# Patient Record
Sex: FEMALE | Race: WHITE
[De-identification: names, ages, dates, MRNs, and addresses within clinical notes are randomized per-mention and may not be internally consistent; named-entity substitution may affect disease eponyms.]

---

## 2018-02-08 ENCOUNTER — HOSPITAL ENCOUNTER (EMERGENCY)
Dept: HOSPITAL 41 - JD.ED | Age: 83
Discharge: HOME | End: 2018-02-08
Payer: MEDICARE
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
## 2018-02-08 DIAGNOSIS — Z96.643: ICD-10-CM

## 2018-02-08 DIAGNOSIS — E78.00: ICD-10-CM

## 2018-02-08 DIAGNOSIS — S00.03XA: ICD-10-CM

## 2018-02-08 DIAGNOSIS — S32.591A: Primary | ICD-10-CM

## 2018-02-08 DIAGNOSIS — I10: ICD-10-CM

## 2018-02-08 DIAGNOSIS — Z79.82: ICD-10-CM

## 2018-02-08 DIAGNOSIS — D69.6: ICD-10-CM

## 2018-02-08 DIAGNOSIS — S51.811A: ICD-10-CM

## 2018-02-08 PROCEDURE — 99284 EMERGENCY DEPT VISIT MOD MDM: CPT

## 2018-02-08 PROCEDURE — 36415 COLL VENOUS BLD VENIPUNCTURE: CPT

## 2018-02-08 PROCEDURE — 73502 X-RAY EXAM HIP UNI 2-3 VIEWS: CPT

## 2018-02-08 PROCEDURE — 84484 ASSAY OF TROPONIN QUANT: CPT

## 2018-02-08 PROCEDURE — 71045 X-RAY EXAM CHEST 1 VIEW: CPT

## 2018-02-08 PROCEDURE — 70450 CT HEAD/BRAIN W/O DYE: CPT

## 2018-02-08 PROCEDURE — 85025 COMPLETE CBC W/AUTO DIFF WBC: CPT

## 2018-02-08 PROCEDURE — 80053 COMPREHEN METABOLIC PANEL: CPT

## 2018-02-08 PROCEDURE — 93005 ELECTROCARDIOGRAM TRACING: CPT

## 2018-02-08 NOTE — EDM.PDOC
ED HPI GENERAL MEDICAL PROBLEM





- General


Chief Complaint: Lower Extremity Injury/Pain


Stated Complaint: FALL/HIP INJURY


Time Seen by Provider: 02/08/18 12:25


Source of Information: Reports: Patient, Family (daughter)


History Limitations: Reports: No Limitations





- History of Present Illness


INITIAL COMMENTS - FREE TEXT/NARRATIVE: 





86-year-old female presents for evaluation and treatment of injury sustained 

from a fall. Patient reports around 0900 this morning she was in the garage 

when she stumbled. She is primarily complaining of pain to her pelvis and both 

hips. States the pain radiates into her groin. She has a skin tear to her right 

arm and she also bumped her head. She is reports significant pain with standing 

and walking. Unsure if she had any syncope. No chest pain, neck pain, nausea or 

vomiting. She states that she did possibly feel dizzy. Patient reports and she 

fell she landed on her right side.





Patient has a history of thrombocytopenia.





She currently takes baby aspirin daily. No other blood thinners.





Patient has a history of bilateral hip replacements. 


Onset: Today


Location: Reports: Pelvis


  ** Pelvic


Pain Score (Numeric/FACES): 6





- Related Data


 Allergies











Allergy/AdvReac Type Severity Reaction Status Date / Time


 


No Known Allergies Allergy   Verified 02/08/18 12:14














Past Medical History





- Past Health History


Medical/Surgical History: Denies Medical/Surgical History


Cardiovascular History: Reports: High Cholesterol, Hypertension


Genitourinary History: Reports: UTI, Recurrent


Psychiatric History: Reports: None





- Past Surgical History


GI Surgical History: Reports: Cholecystectomy


Musculoskeletal Surgical History: Reports: Hip Replacement





Social & Family History





- Tobacco Use


Smoking Status *Q: Unknown Ever Smoked





Review of Systems





- Review of Systems


Review Of Systems: See Below


Cardiovascular: Denies: Chest Pain


GI/Abdominal: Denies: Nausea, Vomiting


Musculoskeletal: Denies: Neck Pain


Skin: Reports: Wound (skin tear right arm)


Neurological: Reports: Dizziness (unsure).  Denies: Headache, Syncope (unsure)





ED EXAM, GENERAL





- Physical Exam


Exam: See Below


Exam Limited By: No Limitations


General Appearance: Alert, WD/WN, Mild Distress


Eye Exam: Bilateral Eye: EOMI, Normal Inspection, PERRL


Ears: Normal External Exam, Normal Canal, Normal TMs


Nose: Normal Inspection


Throat/Mouth: Normal Inspection, Normal Lips, Normal Voice, No Airway Compromise


Head: Atraumatic, Normocephalic


Neck: Normal Inspection, Supple, Non-Tender, Full Range of Motion


Respiratory/Chest: No Respiratory Distress, Lungs Clear, Normal Breath Sounds


Cardiovascular: Normal Peripheral Pulses, Regular Rate, Rhythm, No Murmur


Peripheral Pulses: 2+: Radial (L), Radial (R), Posterior Tibial (L), Posterior 

Tibial (R)


GI/Abdominal: Soft, Non-Tender


Extremities: Normal Inspection, Normal Range of Motion, Non-Tender, Other (no 

shortening of the limbs, no pain with internal and external roatation of the 

bilateral hips)


Neurological: Alert, Oriented, Normal Cognition


Psychiatric: Normal Affect, Normal Mood


Skin Exam: Warm, Dry, Normal Color, Wound/Incision (1cm skin tear right 

posterior forearm)





EKG INTERPRETATION


EKG Date: 02/08/18


Time: 13:15


Rhythm: NSR


Rate (Beats/Min): 77


Axis: Normal


P-Wave: Present


QRS: Normal


ST-T: Normal


QT: Normal


EKG Interpretation Comments: 





NSR at 77 bpm. Early "R" wave transition. Decreased voltage limb leads. 

Reviewed by myself and Dr. Taylor. 





Course





- Vital Signs


Last Recorded V/S: 


 Last Vital Signs











Temp  36.0 C   02/08/18 12:14


 


Pulse  67   02/08/18 12:14


 


Resp  18   02/08/18 12:14


 


BP  174/81 H  02/08/18 12:14


 


Pulse Ox  98   02/08/18 12:14














- Orders/Labs/Meds


Labs: 


 Laboratory Tests











  02/08/18 02/08/18 Range/Units





  13:05 13:05 


 


WBC  9.80   (3.98-10.04)  K/mm3


 


RBC  4.40   (3.98-5.22)  M/mm3


 


Hgb  12.6   (11.2-15.7)  gm/L


 


Hct  38.2   (34.1-44.9)  %


 


MCV  86.8   (79.4-94.8)  fl


 


MCH  28.6   (25.6-32.2)  pg


 


MCHC  33.0   (32.2-35.5)  g/dl


 


RDW Std Deviation  42.7   (36.4-46.3)  fL


 


Plt Count  117 L   (182-369)  K/mm3


 


MPV  9.7   (9.4-12.3)  fl


 


Neut % (Auto)  85.6 H   (34.0-71.1)  %


 


Lymph % (Auto)  8.8 L   (19.3-51.7)  %


 


Mono % (Auto)  4.5 L   (4.7-12.5)  %


 


Eos % (Auto)  0.6 L   (0.7-5.8)  


 


Baso % (Auto)  0.1   (0.1-1.2)  %


 


Neut # (Auto)  8.39 H   (1.56-6.13)  K/mm3


 


Lymph # (Auto)  0.86 L   (1.18-3.74)  K/mm3


 


Mono # (Auto)  0.44 H   (0.24-0.36)  K/mm3


 


Eos # (Auto)  0.06   (0.04-0.36)  K/mm3


 


Baso # (Auto)  0.01   (0.01-0.08)  K/mm3


 


Manual Slide Review  Abnormal smear   


 


Sodium   138  (136-145)  mEq/L


 


Potassium   4.7  (3.5-5.1)  mEq/L


 


Chloride   104  ()  mEq/L


 


Carbon Dioxide   26  (21-32)  mEq/L


 


Anion Gap   12.7  (5-15)  


 


BUN   21 H  (7-18)  mg/dL


 


Creatinine   1.2 H  (0.55-1.02)  mg/dL


 


Est Cr Clr Drug Dosing   26.62  mL/min


 


Estimated GFR (MDRD)   43  (>60)  mL/min


 


BUN/Creatinine Ratio   17.5  (14-18)  


 


Glucose   100  ()  mg/dL


 


Calcium   9.1  (8.5-10.1)  mg/dL


 


Total Bilirubin   1.0  (0.2-1.0)  mg/dL


 


AST   28  (15-37)  U/L


 


ALT   22  (14-59)  U/L


 


Alkaline Phosphatase   61  ()  U/L


 


Troponin I   < 0.017  (0.00-0.056)  ng/mL


 


Total Protein   7.3  (6.4-8.2)  g/dl


 


Albumin   3.8  (3.4-5.0)  g/dl


 


Globulin   3.5  gm/dL


 


Albumin/Globulin Ratio   1.1  (1-2)  











Meds: 


Medications














Discontinued Medications














Generic Name Dose Route Start Last Admin





  Trade Name Freq  PRN Reason Stop Dose Admin


 


Tramadol HCl  50 mg  02/08/18 14:50  02/08/18 14:55





  Ultram  PO  02/08/18 14:51  50 mg





  ONETIME ONE   Administration














- Radiology Interpretation


Free Text/Narrative:: 





chest xray shows no acute intrathroacic process. 





Head CT without contrast shows soft tissue swelling and a hematoma to the right 

posterior scalp.





xray of the pelvis shows a right pubic rami fracture. Bilateral hip prosthesis 

with normal alignment. 





- Re-Assessments/Exams


Free Text/Narrative Re-Assessment/Exam: 





02/08/18 14:50


I reviewed the labs, EKG and imaging with the patient and her daughter. She has 

a walker at home. I will have her follow-up with orthopedics.





Discharge instructions as documented.





Departure





- Departure


Time of Disposition: 14:49


Disposition: Home, Self-Care 01


Condition: Fair


Clinical Impression: 


 Pubic ramus fracture








- Discharge Information


Referrals: 


Vinicio Albrecht MD [Primary Care Provider] - 


Isackson,Ronald D, MD [Physician] - 


Forms:  ED Department Discharge


Additional Instructions: 


Follow-up with orthopedics within 2 weeks for recheck. Recommend Dr. Pierre. 

Call [phone] schedule with him.





Use a walker at all times when up and moving around. 





Rest.





May use over-the-counter Tylenol or Motrin seen for pain relief. For pain not 

relieved by Tylenol or Motrin may take tramadol 1 tab every 4-6 hours. Do not 

drive or operate machinery within 12 hours of taking tramadol. Tramadol can be 

habit-forming, recommend you take as few these as needed  to control your pain.





You may use ice for additional pain relief.





Please return to ER if your symptoms change or worsen.

## 2018-02-08 NOTE — CR
Chest: AP view of the chest was obtained.

 

Comparison: No prior chest x-ray.

 

Heart size is normal.  Mild tortuosity of the thoracic aorta is seen. 

 Central lung markings are slightly increased believed to be 

accentuated from AP technique.  Lungs otherwise are clear.  Bony 

structures are osteopenic.  Surgical clips likely from prior 

cholecystectomy are seen within the upper right abdomen.

 

Impression:

1.  Nothing acute is appreciated on AP chest x-ray.

 

Diagnostic code #2

## 2018-02-08 NOTE — CR
Pelvis and right hip: AP view of the pelvis was obtained as well as 

crosstable lateral view of the right hip.

 

Comparison: Previous left hip study of 05/20/10.

 

Bilateral hip prosthesis are noted which show normal alignment.  

Fractures are seen within the superior right pubic ramus as well as 

within the superior and inferior left pubic ramus.  These appear to be

 acute.  Incidental sclerosis within the pubic symphysis is noted.  No

 additional fracture or other abnormality is appreciated.

 

Impression:

1.  Pubic rami fracture as noted above.

2.  Bilateral hip prosthesis which are normal in alignment.

3.  No other acute abnormality is appreciated.

 

Diagnostic code #3

## 2018-02-08 NOTE — CT
Head CT

 

Technique: Multiple axial sections through the brain were obtained.  

Intravenous contrast was not utilized.

 

Comparison: Prior head CT study of 11/23/15.

 

Findings: Soft tissue swelling and hematoma is seen within the 

posterior right scalp.

 

Ventricles along with basal cisterns and sulci over the convexities 

are mildly prominent.  Diminished density is noted within the 

periventricular and subcortical white matter compatible with mild 

small vessel ischemic demyelination change.  No other abnormal 

parenchymal densities are seen.  No evidence of intracranial 

hemorrhage.  No midline shift or mass effect is seen.

 

Bone window settings were reviewed which shows no acute calvarial 

abnormality.

 

Impression:

1.  Soft tissue swelling and hematoma within the posterior right 

scalp.

2.  Senescent change as noted above.

3.  No acute intracranial abnormality is identified.  No skull 

fracture is seen.

 

Diagnostic code #3

## 2020-05-09 ENCOUNTER — HOSPITAL ENCOUNTER (INPATIENT)
Dept: HOSPITAL 41 - JD.ED | Age: 85
LOS: 5 days | Discharge: HOME | DRG: 390 | End: 2020-05-14
Attending: SURGERY | Admitting: SURGERY
Payer: MEDICARE

## 2020-05-09 DIAGNOSIS — M81.0: ICD-10-CM

## 2020-05-09 DIAGNOSIS — Z79.899: ICD-10-CM

## 2020-05-09 DIAGNOSIS — Z87.440: ICD-10-CM

## 2020-05-09 DIAGNOSIS — H54.7: ICD-10-CM

## 2020-05-09 DIAGNOSIS — E83.51: ICD-10-CM

## 2020-05-09 DIAGNOSIS — E83.42: ICD-10-CM

## 2020-05-09 DIAGNOSIS — K21.9: ICD-10-CM

## 2020-05-09 DIAGNOSIS — E78.00: ICD-10-CM

## 2020-05-09 DIAGNOSIS — K56.609: Primary | ICD-10-CM

## 2020-05-09 DIAGNOSIS — I10: ICD-10-CM

## 2020-05-09 DIAGNOSIS — Z90.49: ICD-10-CM

## 2020-05-09 DIAGNOSIS — Z98.49: ICD-10-CM

## 2020-05-09 DIAGNOSIS — Z96.649: ICD-10-CM

## 2020-05-09 DIAGNOSIS — H91.90: ICD-10-CM

## 2020-05-09 DIAGNOSIS — Z79.82: ICD-10-CM

## 2020-05-09 PROCEDURE — U0002 COVID-19 LAB TEST NON-CDC: HCPCS

## 2020-05-09 PROCEDURE — C9113 INJ PANTOPRAZOLE SODIUM, VIA: HCPCS

## 2020-05-09 NOTE — EDM.PDOC
ED HPI GENERAL MEDICAL PROBLEM





- General


Chief Complaint: Gastrointestinal Problem


Stated Complaint: VOMITTING SICK TO STOMACH


Time Seen by Provider: 05/09/20 21:58


Source of Information: Reports: Patient


History Limitations: Reports: No Limitations





- History of Present Illness


INITIAL COMMENTS - FREE TEXT/NARRATIVE: 





This is an 88-year-old female.  Yesterday she indicates that she had a bunch of 

friends over the brought lots of food and she had 2 big meals and she thinks 

they had MSG in them.  Around midnight she had onset of nausea and vomiting.  

She is vomited about 8 times since midnight.  She did have a small stool this 

morning and she passed some gas this afternoon.  The vomitus is sort of a dark 

yellow-brown but there are no coffee grounds noted.  She has had no diarrhea.  

She complains of generalized abdominal pain and not specific.  She says she has 

not been able to drink any water since every time she drinks water she vomits.  

Only 2 surgeries she had are an appendix and gallbladder.  She denies any 

history of abdominal obstruction.  She denies any fever or chills and no cough.

  Patient indicates her last urination was 20 minutes before coming to the ER.


  ** Abdomen


Pain Score (Numeric/FACES): 2





- Related Data


 Allergies











Allergy/AdvReac Type Severity Reaction Status Date / Time


 


No Known Allergies Allergy   Verified 05/09/20 21:54











Home Meds: 


 Home Meds





Aspirin [Halfprin] 81 mg PO DAILY 05/09/20 [History]


Cholecalciferol (Vitamin D3) [Vitamin D3] 2,000 unit PO DAILY 05/09/20 [History]


Denosumab [Prolia] 60 mg .XX ASDIRECTED 05/09/20 [History]


Fesoterodine Fumarate [Toviaz] 4 mg PO DAILY 05/09/20 [History]


Losartan [Cozaar] 50 mg PO DAILY 05/09/20 [History]


Omeprazole 20 mg PO ONETIME 05/09/20 [History]


Pravastatin [Pravachol] 40 mg PO DAILY 05/09/20 [History]











Past Medical History





- Past Health History


Medical/Surgical History: Denies Medical/Surgical History


HEENT History: Reports: Cataract, Hard of Hearing, Impaired Vision


Other HEENT History: Wears glasses


Cardiovascular History: Reports: High Cholesterol, Hypertension


Genitourinary History: Reports: UTI, Recurrent


OB/GYN History: Reports: Pregnancy


Psychiatric History: Reports: None





- Past Surgical History


HEENT Surgical History: Reports: Cataract Surgery, Tonsillectomy


GI Surgical History: Reports: Cholecystectomy


Musculoskeletal Surgical History: Reports: Hip Replacement





Social & Family History





- Tobacco Use


Smoking Status *Q: Never Smoker





- Recreational Drug Use


Recreational Drug Use: No





ED ROS GENERAL





- Review of Systems


Review Of Systems: See Below


Constitutional: Denies: Fever, Chills


HEENT: Reports: No Symptoms


Respiratory: Denies: Shortness of Breath, Cough


Cardiovascular: Denies: Chest Pain


Endocrine: Reports: No Symptoms


GI/Abdominal: Reports: Abdominal Pain, Constipation, Nausea, Vomiting.  Denies: 

Diarrhea


: Reports: No Symptoms


Musculoskeletal: Reports: Other (Generalized arthritic type pain)


Skin: Reports: No Symptoms


Neurological: Reports: No Symptoms


Psychiatric: Reports: No Symptoms


Hematologic/Lymphatic: Reports: No Symptoms





ED EXAM, GI/ABD





- Physical Exam


Exam: See Below


Exam Limited By: No Limitations


General Appearance: Alert, WD/WN, No Apparent Distress


Eyes: Bilateral: Normal Appearance


Ears: Normal External Exam, Other (Patient has bilateral hearing aids noted)


Nose: Normal Inspection


Throat/Mouth: Normal Inspection, Normal Lips, Normal Voice, No Airway Compromise

, Other (Upper and lower dentures, her mucous membranes are tacky but not dry)


Head: Normocephalic


Neck: Supple


Respiratory/Chest: No Respiratory Distress, Lungs Clear, Normal Breath Sounds


Cardiovascular: Regular Rate, Rhythm, No Murmur


GI/Abdominal Exam: Soft, Other (The patient is not distended, she seems to be 

soft with no evidence of localized tenderness on palpation, there is no rebound 

there is no rigidity there is distention seems to be tympanic like she is got 

trapped air in her bowel)


Back Exam: Full Range of Motion


Extremities: Normal Inspection, Normal Range of Motion


Neurological: Alert, Oriented


Psychiatric: Normal Affect, Normal Mood


Skin Exam: Warm, Dry





Course





- Vital Signs


Last Recorded V/S: 


 Last Vital Signs











Temp  98.6 F   05/09/20 21:55


 


Pulse  78   05/09/20 21:55


 


Resp  18   05/09/20 21:55


 


BP  189/89 H  05/09/20 23:25


 


Pulse Ox  93 L  05/09/20 21:55














- Orders/Labs/Meds


Orders: 


 Active Orders 24 hr











 Category Date Time Status


 


 Abdomen 2V AP Flat Upright [CR] Stat Exams  05/10/20 01:12 Taken


 


 Abdomen Pelvis w Cont [CT] Stat Exams  05/10/20 02:08 Taken


 


 Lactated Ringers [Ringers, Lactated] 1,000 ml Med  05/10/20 04:30 Active





 IV ASDIRECTED   


 


 Sodium Chloride 0.9% [Normal Saline] 1,000 ml Med  05/09/20 22:15 Active





 IV ASDIRECTED   


 


 Sodium Chloride 0.9% [Normal Saline] 1,000 ml Med  05/09/20 23:45 Active





 IV ASDIRECTED   


 


 Sodium Chloride 0.9% [Saline Flush] Med  05/10/20 02:33 Active





 10 ml FLUSH ONETIME PRN   








 Medication Orders





Sodium Chloride (Normal Saline)  1,000 mls @ 1,000 mls/hr IV ASDIRECTED GRICELDA


   Last Admin: 05/09/20 22:28  Dose: 1,000 mls/hr


Sodium Chloride (Normal Saline)  1,000 mls @ 1,000 mls/hr IV ASDIRECTED GRICELDA


   Last Admin: 05/09/20 23:47  Dose: 1,000 mls/hr


Lactated Ringer's (Ringers, Lactated)  1,000 mls @ 75 mls/hr IV ASDIRECTED GRICELDA


Sodium Chloride (Saline Flush)  10 ml FLUSH ONETIME PRN


   PRN Reason: KEEP VEIN OPEN


   Last Admin: 05/10/20 03:27  Dose: 10 ml








Labs: 


 Laboratory Tests











  05/09/20 05/09/20 05/10/20 Range/Units





  22:11 22:11 00:25 


 


WBC  8.45    (3.98-10.04)  K/mm3


 


RBC  4.97    (3.98-5.22)  M/mm3


 


Hgb  14.1  D    (11.2-15.7)  gm/dl


 


Hct  43.2    (34.1-44.9)  %


 


MCV  86.9    (79.4-94.8)  fl


 


MCH  28.4    (25.6-32.2)  pg


 


MCHC  32.6    (32.2-35.5)  g/dl


 


RDW Std Deviation  43.2    (36.4-46.3)  fL


 


Plt Count  98 L    (182-369)  K/mm3


 


MPV  10.8    (9.4-12.3)  fl


 


Neut % (Auto)  90.0 H    (34.0-71.1)  %


 


Lymph % (Auto)  6.4 L    (19.3-51.7)  %


 


Mono % (Auto)  3.4 L    (4.7-12.5)  %


 


Eos % (Auto)  0.1 L    (0.7-5.8)  


 


Baso % (Auto)  0.0 L    (0.1-1.2)  %


 


Neut # (Auto)  7.60 H    (1.56-6.13)  K/mm3


 


Lymph # (Auto)  0.54 L    (1.18-3.74)  K/mm3


 


Mono # (Auto)  0.29    (0.24-0.36)  K/mm3


 


Eos # (Auto)  0.01 L    (0.04-0.36)  K/mm3


 


Baso # (Auto)  0.00 L    (0.01-0.08)  K/mm3


 


Manual Slide Review  Abnormal smear    


 


Sodium   137   (136-145)  mEq/L


 


Potassium   4.2   (3.5-5.1)  mEq/L


 


Chloride   100   ()  mEq/L


 


Carbon Dioxide   28   (21-32)  mEq/L


 


Anion Gap   13.2   (5-15)  


 


BUN   17   (7-18)  mg/dL


 


Creatinine   1.2 H   (0.55-1.02)  mg/dL


 


Est Cr Clr Drug Dosing   27.98   mL/min


 


Estimated GFR (MDRD)   42   (>60)  mL/min


 


BUN/Creatinine Ratio   14.2   (14-18)  


 


Glucose   175 H   ()  mg/dL


 


Calcium   9.5   (8.5-10.1)  mg/dL


 


Total Bilirubin   1.4 H   (0.2-1.0)  mg/dL


 


AST   19   (15-37)  U/L


 


ALT   16   (14-59)  U/L


 


Alkaline Phosphatase   50   ()  U/L


 


Total Protein   7.6   (6.4-8.2)  g/dl


 


Albumin   4.0   (3.4-5.0)  g/dl


 


Globulin   3.6   gm/dL


 


Albumin/Globulin Ratio   1.1   (1-2)  


 


Lipase   107   ()  U/L


 


Urine Color    Yellow  (Yellow)  


 


Urine Appearance    Clear  (Clear)  


 


Urine pH    7.0  (5.0-8.0)  


 


Ur Specific Gravity    1.020  (1.005-1.030)  


 


Urine Protein    2+ H  (Negative)  


 


Urine Glucose (UA)    Negative  (Negative)  


 


Urine Ketones    1+ H  (Negative)  


 


Urine Occult Blood    2+ H  (Negative)  


 


Urine Nitrite    Negative  (Negative)  


 


Urine Bilirubin    Negative  (Negative)  


 


Urine Urobilinogen    0.2  (0.2-1.0)  


 


Ur Leukocyte Esterase    Negative  (Negative)  


 


Urine RBC    5-10 H  (0-5)  /hpf


 


Urine WBC    0-5  (0-5)  /hpf


 


Ur Epithelial Cells    0-5  (0-5)  /hpf


 


Urine Bacteria    Not seen  (FEW)  /hpf


 


Urine Mucus    Not seen  (FEW)  /hpf











Meds: 


Medications











Generic Name Dose Route Start Last Admin





  Trade Name Bonifacio  PRN Reason Stop Dose Admin


 


Sodium Chloride  1,000 mls @ 1,000 mls/hr  05/09/20 22:15  05/09/20 22:28





  Normal Saline  IV   1,000 mls/hr





  ASDIRECTED GRICELDA   Administration





     





     





     





     


 


Sodium Chloride  1,000 mls @ 1,000 mls/hr  05/09/20 23:45  05/09/20 23:47





  Normal Saline  IV   1,000 mls/hr





  ASDIRECTED GRICELDA   Administration





     





     





     





     


 


Lactated Ringer's  1,000 mls @ 75 mls/hr  05/10/20 04:30  





  Ringers, Lactated  IV   





  ASDIRECTED GRICELDA   





     





     





     





     


 


Sodium Chloride  10 ml  05/10/20 02:33  05/10/20 03:27





  Saline Flush  FLUSH   10 ml





  ONETIME PRN   Administration





  KEEP VEIN OPEN   





     





     





     














Discontinued Medications














Generic Name Dose Route Start Last Admin





  Trade Name Bonifacio  PRN Reason Stop Dose Admin


 


Diatrizoate Meglum/Diatrizoate Sod  90 ml  05/10/20 02:33  05/10/20 03:27





  Gastrografin 37%  PO  05/10/20 02:34  90 ml





  ONETIME ONE   Administration





     





     





     





     


 


Iopamidol  100 ml  05/10/20 02:33  05/10/20 03:27





  Isovue-300 (61%)  IVPUSH  05/10/20 02:34  100 ml





  ONETIME ONE   Administration





     





     





     





     


 


Losartan Potassium  50 mg  05/09/20 23:16  05/09/20 23:25





  Cozaar  PO  05/09/20 23:17  50 mg





  ONETIME STA   Administration





     





     





     





     


 


Ondansetron HCl  4 mg  05/09/20 22:15  05/09/20 22:28





  Zofran  IVPUSH  05/09/20 22:16  4 mg





  ONETIME ONE   Administration





     





     





     





     














- Radiology Interpretation


Free Text/Narrative:: 





Flat and upright shows ileus with multiple air-fluid levels.  There is no other 

acute abnormality noted





- Re-Assessments/Exams


Free Text/Narrative Re-Assessment/Exam: 





05/10/20 01:51


I reexamined the patient and she still is somewhat distended though she is not 

complaining of any significant pain.  After looking at the x-ray showing some 

multiple air-fluid levels and a big stomach bubble I am concerned that she 

might have an early obstruction.


05/10/20 02:03


02 the patient regarding this ileus and I am little concerned since she has 

been passing stool or gas that she might have an early obstruction.  Therefore 

we will going to give her oral and IV contrast for a CT of her abdomen.  I am 

not certain she will keep the oral down but we are going to try.  I also spoke 

to her daughter regarding what were doing and why were doing it and she was 

good with it.


05/10/20 04:26


With the daughter regarding the CT scan results that she has a small bowel 

obstruction in the right lower quadrant.  I also spoke with Lv who is the 

surgeon on call and he agrees to put her in the hospital for further evaluation 

and treatment.  We did place an NG tube at low intermittent suction.





Departure





- Departure


Time of Disposition: 04:27


Disposition: Admitted As Inpatient 66


Condition: Fair


Clinical Impression: 


 Small bowel obstruction





Nausea and vomiting


Qualifiers:


 Vomiting type: unspecified Vomiting Intractability: non-intractable Qualified 

Code(s): R11.2 - Nausea with vomiting, unspecified





Abdominal pain


Qualifiers:


 Abdominal location: unspecified location Qualified Code(s): R10.9 - 

Unspecified abdominal pain








- Discharge Information





Sepsis Event Note





- Evaluation


Sepsis Screening Result: No Definite Risk





- Focused Exam


Vital Signs: 


 Vital Signs











  Temp Pulse Resp BP BP Pulse Ox


 


 05/09/20 23:25     189/89 H  


 


 05/09/20 21:55  98.6 F  78  18   197/104 H  93 L











Date Exam was Performed: 05/10/20


Time Exam was Performed: 04:25





ED Communication





- ED Communication Date/Time


Date: 05/10/20


Time Called: 04:26





- Discussed Case With (1)


Discussed Case With (1): Admitting Provider


Person/s Notified (1): Lewis Awan (I spoke to the surgeon and he agrees 

to admit for further evaluation and treatment)





- My Orders


Last 24 Hours: 


My Active Orders





05/09/20 22:15


Sodium Chloride 0.9% [Normal Saline] 1,000 ml IV ASDIRECTED 





05/09/20 23:45


Sodium Chloride 0.9% [Normal Saline] 1,000 ml IV ASDIRECTED 





05/10/20 01:12


Abdomen 2V AP Flat Upright [CR] Stat 





05/10/20 02:08


Abdomen Pelvis w Cont [CT] Stat 





05/10/20 02:33


Sodium Chloride 0.9% [Saline Flush]   10 ml FLUSH ONETIME PRN 





05/10/20 04:30


Lactated Ringers [Ringers, Lactated] 1,000 ml IV ASDIRECTED 














- Assessment/Plan


Last 24 Hours: 


My Active Orders





05/09/20 22:15


Sodium Chloride 0.9% [Normal Saline] 1,000 ml IV ASDIRECTED 





05/09/20 23:45


Sodium Chloride 0.9% [Normal Saline] 1,000 ml IV ASDIRECTED 





05/10/20 01:12


Abdomen 2V AP Flat Upright [CR] Stat 





05/10/20 02:08


Abdomen Pelvis w Cont [CT] Stat 





05/10/20 02:33


Sodium Chloride 0.9% [Saline Flush]   10 ml FLUSH ONETIME PRN 





05/10/20 04:30


Lactated Ringers [Ringers, Lactated] 1,000 ml IV ASDIRECTED

## 2020-05-10 RX ADMIN — HEPARIN SODIUM SCH UNITS: 5000 INJECTION, SOLUTION INTRAVENOUS; SUBCUTANEOUS at 08:59

## 2020-05-10 RX ADMIN — HEPARIN SODIUM SCH UNITS: 5000 INJECTION, SOLUTION INTRAVENOUS; SUBCUTANEOUS at 16:29

## 2020-05-10 NOTE — CT
CT abdomen and pelvis

 

Technique: Multiple axial sections were obtained from above the dome 

of the diaphragm inferiorly through the pubic symphysis.  Intravenous 

contrast was utilized.  No oral contrast has been given.  Delayed 

images were also obtained through the bladder.

 

Comparison: Prior abdominal x-ray performed earlier on the same day 

(1:16 AM).

 

Findings: Visualized lung bases show nothing acute.

 

Liver contains no focal parenchymal abnormality.  Spleen appears 

normal.  Small amount of fluid is noted around the liver.  Adrenal 

glands show no nodule.  Surgical clips are seen from prior 

cholecystectomy.  Kidneys show symmetric contrast enhancement.  2 

small low density lesions are seen within the left kidney which are 

too small to characterize by Hounsfield unit measurements.  Findings 

most likely due to small cortical cysts.  Similar findings are noted 

within the right kidney.

 

Fluid is noted within the distal esophagus.  Increased fluid is noted 

within the stomach.  Fluid filled small bowel is noted.  Transition 

point appears within the right lower abdomen.  Etiology of this small 

bowel obstruction is not seen and findings most likely are due to 

adhesions if patient has had prior abdominal surgery.

 

Aorta shows atherosclerotic calcification without aneurysm.  

Atherosclerotic calcification continues into the iliac vessels.  No 

retroperitoneal adenopathy or mesenteric abnormalities are seen.  

Small fat-containing umbilical hernia is noted.  Artifact is noted 

with the pelvis from bilateral hip prosthesis.

 

Scattered diverticuli are seen within the colon without findings of 

definite diverticulitis.

 

Appendix not visualized with certainty.

 

Delayed images shows contrast within the bladder.

 

Bone window settings were reviewed which shows mild scattered 

degenerative change within the spine.  Hemangioma appears to be 

present within L3.  No acute osseous finding is appreciated.

 

Impression:

1.  Fluid-filled stomach as well as findings of small bowel 

obstruction with transition point within the right lower abdomen.  

Findings most likely represent obstruction from adhesions.  

Significant reflux of fluid into the esophagus is noted.

2.  Small amount of ascites around the liver.

3.  Other nonacute findings as described above.

 

Diagnostic code #5

 

This report was dictated in MDT

 

I agree with preliminary report from [facility], finalized on 05/10/20, 4:48

 AM Central Daylight Time

## 2020-05-10 NOTE — PCM.HP.2
H&P History of Present Illness





- General


Date of Service: 05/10/20


Admit Problem/Dx: 


 Admission Diagnosis/Problem





Admission Diagnosis/Problem      Small bowel obstruction








Source of Information: Patient


History Limitations: Reports: No Limitations





- History of Present Illness


Duration of Symptoms: Reports: Day(s):


Other HPI/Comments: 





Mrs. Gama is an 87 yo woman presenting with abdominal pain, distention, 

vomiting and obstipation that began yesterday. She has not had symptoms like 

this before. She has a CT scan on admission showing evidence of small bowel 

obstruction with transition point involving the ileum near the right lateral 

mid abdomen. An NG tube was placed in the emergency room and  since then there 

has been over 1 L of output. She experienced symptomatic relief with NG 

decompression. She has a history of open cholecystectomy and open appendectomy. 

There is no evidence of inguinal or femoral or incisional hernia. She currently 

appears well clinically. 


  ** Abdomen


Pain Score (Numeric/FACES): 2





- Related Data


Allergies/Adverse Reactions: 


 Allergies











Allergy/AdvReac Type Severity Reaction Status Date / Time


 


No Known Allergies Allergy   Verified 05/09/20 21:54











Home Medications: 


 Home Meds





Aspirin [Halfprin] 81 mg PO DAILY 05/09/20 [History]


Cholecalciferol (Vitamin D3) [Vitamin D3] 2,000 unit PO DAILY 05/09/20 [History]


Denosumab [Prolia] 60 mg .XX ASDIRECTED 05/09/20 [History]


Fesoterodine Fumarate [Toviaz] 4 mg PO DAILY 05/09/20 [History]


Losartan [Cozaar] 50 mg PO BEDTIME 05/09/20 [History]


Omeprazole 20 mg PO DAILY 05/09/20 [History]


Pravastatin [Pravachol] 40 mg PO DAILY 05/09/20 [History]


Sulfamethoxazole/Trimethoprim [Bactrim 400-80 MG] 1 tab PO DAILY 05/10/20 [

History]











Past Medical History





- Past Health History


Medical/Surgical History: Denies Medical/Surgical History


HEENT History: Reports: Cataract, Hard of Hearing, Impaired Vision


Other HEENT History: Wears glasses and hearing aides


Cardiovascular History: Reports: High Cholesterol, Hypertension


Genitourinary History: Reports: UTI, Recurrent


OB/GYN History: Reports: Pregnancy


Musculoskeletal History: Reports: Arthritis


Psychiatric History: Reports: None





- Infectious Disease History


Infectious Disease History: Reports: Chicken Pox, Measles, Mumps





- Past Surgical History


HEENT Surgical History: Reports: Cataract Surgery, Tonsillectomy


Cardiovascular Surgical History: Reports: None


GI Surgical History: Reports: Cholecystectomy


Female  Surgical History: Reports: None


Musculoskeletal Surgical History: Reports: None, Hip Replacement, Other (See 

Below)


Other Musculoskeletal Surgeries/Procedures:: Bilat





Social & Family History





- Family History


Family Medical History: Noncontributory





- Tobacco Use


Smoking Status *Q: Never Smoker


Second Hand Smoke Exposure: No





- Caffeine Use


Caffeine Use: Reports: None





- Recreational Drug Use


Recreational Drug Use: No





H&P Review of Systems





- Review of Systems:


Review Of Systems: See Below


General: Reports: Malaise


HEENT: Reports: No Symptoms


Pulmonary: Reports: No Symptoms


Cardiovascular: Reports: No Symptoms


Gastrointestinal: Reports: Abdominal Pain, Distension, Nausea, Vomiting


Genitourinary: Reports: Other (recurrent UTI)


Musculoskeletal: Reports: No Symptoms


Skin: Reports: No Symptoms


Psychiatric: Reports: No Symptoms


Neurological: Reports: No Symptoms


Hematologic/Lymphatic: Reports: Easy Bleeding, Easy Bruising





Exam





- Exam


Exam: See Below





- Vital Signs


Vital Signs: 


 Last Vital Signs











Temp  36.4 C   05/10/20 07:44


 


Pulse  98   05/10/20 07:44


 


Resp  20   05/10/20 07:44


 


BP  149/86 H  05/10/20 07:44


 


Pulse Ox  95   05/10/20 07:44











Weight: 59.058 kg





- Exam


General: Alert, Oriented, Cooperative


HEENT: Conjunctiva Clear


Neck: Trachea Midline


Lungs: Normal Respiratory Effort


Cardiovascular: Regular Rate


GI/Abdominal Exam: Soft, Distended, Tender, Other (dull to percussion, no 

palpable mass. Scar from open cholecystectomy and right hip surgery)


Skin: Warm, Dry


Neuro Extensive - Mental Status: Alert, Oriented x3


Psychiatric: Normal Mood





- Patient Data


Lab Results Last 24 hrs: 


 Laboratory Results - last 24 hr











  05/09/20 05/09/20 05/10/20 Range/Units





  22:11 22:11 00:25 


 


WBC  8.45    (3.98-10.04)  K/mm3


 


RBC  4.97    (3.98-5.22)  M/mm3


 


Hgb  14.1  D    (11.2-15.7)  gm/dl


 


Hct  43.2    (34.1-44.9)  %


 


MCV  86.9    (79.4-94.8)  fl


 


MCH  28.4    (25.6-32.2)  pg


 


MCHC  32.6    (32.2-35.5)  g/dl


 


RDW Std Deviation  43.2    (36.4-46.3)  fL


 


Plt Count  98 L    (182-369)  K/mm3


 


MPV  10.8    (9.4-12.3)  fl


 


Neut % (Auto)  90.0 H    (34.0-71.1)  %


 


Lymph % (Auto)  6.4 L    (19.3-51.7)  %


 


Mono % (Auto)  3.4 L    (4.7-12.5)  %


 


Eos % (Auto)  0.1 L    (0.7-5.8)  


 


Baso % (Auto)  0.0 L    (0.1-1.2)  %


 


Neut # (Auto)  7.60 H    (1.56-6.13)  K/mm3


 


Lymph # (Auto)  0.54 L    (1.18-3.74)  K/mm3


 


Mono # (Auto)  0.29    (0.24-0.36)  K/mm3


 


Eos # (Auto)  0.01 L    (0.04-0.36)  K/mm3


 


Baso # (Auto)  0.00 L    (0.01-0.08)  K/mm3


 


Manual Slide Review  Abnormal smear    


 


Sodium   137   (136-145)  mEq/L


 


Potassium   4.2   (3.5-5.1)  mEq/L


 


Chloride   100   ()  mEq/L


 


Carbon Dioxide   28   (21-32)  mEq/L


 


Anion Gap   13.2   (5-15)  


 


BUN   17   (7-18)  mg/dL


 


Creatinine   1.2 H   (0.55-1.02)  mg/dL


 


Est Cr Clr Drug Dosing   27.98   mL/min


 


Estimated GFR (MDRD)   42   (>60)  mL/min


 


BUN/Creatinine Ratio   14.2   (14-18)  


 


Glucose   175 H   ()  mg/dL


 


Calcium   9.5   (8.5-10.1)  mg/dL


 


Total Bilirubin   1.4 H   (0.2-1.0)  mg/dL


 


AST   19   (15-37)  U/L


 


ALT   16   (14-59)  U/L


 


Alkaline Phosphatase   50   ()  U/L


 


Total Protein   7.6   (6.4-8.2)  g/dl


 


Albumin   4.0   (3.4-5.0)  g/dl


 


Globulin   3.6   gm/dL


 


Albumin/Globulin Ratio   1.1   (1-2)  


 


Lipase   107   ()  U/L


 


Urine Color    Yellow  (Yellow)  


 


Urine Appearance    Clear  (Clear)  


 


Urine pH    7.0  (5.0-8.0)  


 


Ur Specific Gravity    1.020  (1.005-1.030)  


 


Urine Protein    2+ H  (Negative)  


 


Urine Glucose (UA)    Negative  (Negative)  


 


Urine Ketones    1+ H  (Negative)  


 


Urine Occult Blood    2+ H  (Negative)  


 


Urine Nitrite    Negative  (Negative)  


 


Urine Bilirubin    Negative  (Negative)  


 


Urine Urobilinogen    0.2  (0.2-1.0)  


 


Ur Leukocyte Esterase    Negative  (Negative)  


 


Urine RBC    5-10 H  (0-5)  /hpf


 


Urine WBC    0-5  (0-5)  /hpf


 


Ur Epithelial Cells    0-5  (0-5)  /hpf


 


Urine Bacteria    Not seen  (FEW)  /hpf


 


Urine Mucus    Not seen  (FEW)  /hpf











Result Diagrams: 


 05/09/20 22:11





 05/09/20 22:11





Sepsis Event Note





- Evaluation


Sepsis Screening Result: No Definite Risk





- Focused Exam


Vital Signs: 


 Vital Signs











  Temp Temp Pulse Pulse Resp BP BP


 


 05/10/20 07:44  36.4 C   98   20  149/86 H 


 


 05/10/20 05:34  36.7 C   102 H   20  140/70 


 


 05/09/20 23:25       189/89 H 


 


 05/09/20 21:55   37.0 C   78  18   197/104 H














  Pulse Ox


 


 05/10/20 07:44  95


 


 05/10/20 05:34  93 L


 


 05/09/20 23:25 


 


 05/09/20 21:55  93 L











Date Exam was Performed: 05/10/20


Time Exam was Performed: 08:35





*Q Meaningful Use (ADM)





- VTE Risk Assess *Q


Each Risk Factor Represents 3 Points: Age 75 Years or Greater


Total Score 3 Point Risk Factors: 3


Problem List Initiated/Reviewed/Updated: Yes


Orders Last 24hrs: 


 Active Orders 24 hr











 Category Date Time Status


 


 Patient Status [ADT] Routine ADT  05/10/20 04:30 Active


 


 Ambulate [RC] PER UNIT ROUTINE Care  05/10/20 08:32 Active


 


 Antiembolic Devices [RC] PER UNIT ROUTINE Care  05/10/20 08:30 Active


 


 NG [Gastrointestinal Tube Mgmt] [RC] QSHIFT Care  05/10/20 05:35 Active


 


 Oxygen Therapy Adult [Oxygen Therapy] [RC] ASDIRECTED Care  05/10/20 06:27 

Active


 


 RT Incentive Spirometry [RC] Q1HWA Care  05/10/20 08:31 Active


 


 Up With Assistance [RC] BID Care  05/10/20 05:34 Active


 


 NPO [Nothing Per Oral Diet] [DIET] Diet  05/10/20 Breakfast Active


 


 Abdomen 2V AP Flat Upright [CR] Stat Exams  05/10/20 01:12 Taken


 


 Abdomen Pelvis w Cont [CT] Stat Exams  05/10/20 02:08 Taken


 


 CBC WITH AUTO DIFF [HEME] AM Lab  05/11/20 05:11 Ordered


 


 CMP [COMPREHENSIVE METABOLIC PN,CMP] [CHEM] Routine Lab  05/11/20 05:00 Ordered


 


 Heparin Sodium Med  05/10/20 08:30 Active





 5,000 units SUBCUT Q8H   


 


 Lactated Ringers [Ringers, Lactated] 1,000 ml Med  05/10/20 04:30 Active





 IV ASDIRECTED   


 


 Ondansetron [Zofran] Med  05/10/20 05:37 Active





 4 mg IVPUSH Q6H PRN   


 


 Pantoprazole [ProTONIX IV***] 40 mg Med  05/10/20 09:00 Active





 Sodium Chloride 0.9% [Normal Saline] 100 ml   





 IV DAILY   


 


 Sodium Chloride 0.9% [Saline Flush] Med  05/10/20 02:33 Active





 10 ml FLUSH ONETIME PRN   


 


 hydrALAZINE [Apresoline] Med  05/10/20 04:26 Active





 5 mg IVPUSH Q2H PRN   


 


 SCD [Sequential Compression Device] [OM.PC] Routine Oth  05/10/20 08:29 Ordered


 


 Code Status [Resuscitation Status] Routine Resus Stat  05/10/20 05:41 Ordered








 Medication Orders





Heparin Sodium (Porcine) (Heparin Sodium)  5,000 units SUBCUT Q8H GRICELDA


Hydralazine HCl (Apresoline)  5 mg IVPUSH Q2H PRN


   PRN Reason: Hypertension


   Last Admin: 05/10/20 04:32  Dose: 5 mg


Lactated Ringer's (Ringers, Lactated)  1,000 mls @ 75 mls/hr IV ASDIRECTED GRICELDA


   Last Admin: 05/10/20 04:33  Dose: 75 mls/hr


Pantoprazole Sodium 40 mg/ (Sodium Chloride)  100 mls @ 200 mls/hr IV DAILY GRICELDA


Ondansetron HCl (Zofran)  4 mg IVPUSH Q6H PRN


   PRN Reason: Nausea


Sodium Chloride (Saline Flush)  10 ml FLUSH ONETIME PRN


   PRN Reason: KEEP VEIN OPEN


   Last Admin: 05/10/20 03:27  Dose: 10 ml








Assessment/Plan Comment:: 





Small bowel obstruction in patient with history of laparotomy/cholecystectomy. 

Initial episode. Appears stable, in no distress. 





Plan for NG decompression, gentle fluid resuscitation and await return of bowel 

function. 


-incentive spirometry, regular ambulation


-IVF @ 75 cc/hr. hydralazine prn HTN with SBP > 180 mm Hg. Hold home 

antihypertensives/diuretics


-record NG output


-hold bactrim for now; U/A on admission shows no sign of current UTI. monitor 

urine output. 


-heparin SC 5000 u q8h for dvt ppx. SCDs. 


-repeat labs in AM. 








-I reviewed the diagnosis with the patient and the plan for non-operative 

management. If she fails to show signs of resolution of obstruction within 72 

hours, we will discuss the option of going to the OR for exploration and 

adhesiolysis in more detail. 





- Mortality Measure


Prognosis:: Good

## 2020-05-10 NOTE — CR
Abdomen: Supine and upright views of the abdomen were obtained.

 

Comparison: No prior abdominal x-ray is available.

 

Large amount of fluid is seen within the stomach with air-fluid level.

  Bowel gas pattern otherwise appears within normal limits on plain 

film study.  Bilateral hip prosthesis are noted.  Surgical clips are 

seen from prior cholecystectomy.  Bony structures are osteopenic.

 

Impression:

1.  Fluid-filled stomach with air-fluid level.

2.  Other findings believed to be incidental as noted above.

 

Diagnostic code #3

 

This report was dictated in MDT

## 2020-05-10 NOTE — CR
Chest: Portable view of the chest was obtained.

 

Comparison: Prior abdominal x-ray of 05/10/20.  Prior chest x-ray of 

02/08/18.

 

Nasogastric tube is seen.  Tip lie slightly past the gastroesophageal 

junction.  NG tube should be advanced by another 7-8 cm for optimal 

position.  Heart is enlarged.  Tortuous thoracic aorta is seen.  Lung 

markings are increased which appear stable.  Bony structures are 

grossly intact.  Previous cholecystectomy is noted.  Contrast noted 

within the kidneys from CT study performed earlier on the same day.

 

Impression:

1.  Tip of nasogastric tube lying slightly past the gastroesophageal 

junction which should be advanced by another 7-8 cm for optimal 

position.

2.  Other findings as noted above believed to be stable.

 

Diagnostic code #3

 

This report was dictated in MDT

## 2020-05-11 RX ADMIN — HEPARIN SODIUM SCH UNITS: 5000 INJECTION, SOLUTION INTRAVENOUS; SUBCUTANEOUS at 09:12

## 2020-05-11 RX ADMIN — HEPARIN SODIUM SCH UNITS: 5000 INJECTION, SOLUTION INTRAVENOUS; SUBCUTANEOUS at 16:28

## 2020-05-11 RX ADMIN — HEPARIN SODIUM SCH UNITS: 5000 INJECTION, SOLUTION INTRAVENOUS; SUBCUTANEOUS at 23:57

## 2020-05-11 RX ADMIN — DEXTROSE MONOHYDRATE, SODIUM CHLORIDE, AND POTASSIUM CHLORIDE SCH MLS/HR: 50; 4.5; 1.49 INJECTION, SOLUTION INTRAVENOUS at 23:57

## 2020-05-11 RX ADMIN — DEXTROSE MONOHYDRATE, SODIUM CHLORIDE, AND POTASSIUM CHLORIDE SCH MLS/HR: 50; 4.5; 1.49 INJECTION, SOLUTION INTRAVENOUS at 10:30

## 2020-05-11 RX ADMIN — HEPARIN SODIUM SCH UNITS: 5000 INJECTION, SOLUTION INTRAVENOUS; SUBCUTANEOUS at 02:32

## 2020-05-11 NOTE — PCM.SN.2
- Free Text/Narrative


Note: 





Hospital day 2





S: occasional episodes of bloating and abdominal pain. No flatus. 





O:


AF-VSS


No appreciable jaundice


NG with over 1200 cc brown liquid output in 24 hrs


Breathing comfortably on room air


Abdomen slightly distended, soft, dull to percussion


Overall lab work improved/ in normal range except total bilirubin, up from 1.4 

to 1.9 mg/dL





A:


Small bowel obstruction in woman with history of laparotomy and 

cholecystectomy. Appears well clinically with NG decompression. 





P:


Continued NG decompression and await return of bowel function


Switch IV fluids to D5 1/2 NS w 20 mEq KCl


Add on indirect bilirubin to monring labs; LFTs not suggestive of biliary 

obstruction or hepatic inflammation otherwise


Up out of bed ambulating


Discussed possible need for operative intervention within the next day or two 

if symptoms fail to resolve.

## 2020-05-12 RX ADMIN — BENZOCAINE PRN SPRAY: 200 SPRAY DENTAL; ORAL; PERIODONTAL at 13:54

## 2020-05-12 RX ADMIN — DEXTROSE MONOHYDRATE, SODIUM CHLORIDE, AND POTASSIUM CHLORIDE SCH MLS/HR: 50; 4.5; 1.49 INJECTION, SOLUTION INTRAVENOUS at 13:54

## 2020-05-12 RX ADMIN — BENZOCAINE PRN SPRAY: 200 SPRAY DENTAL; ORAL; PERIODONTAL at 08:14

## 2020-05-12 RX ADMIN — HEPARIN SODIUM SCH UNITS: 5000 INJECTION, SOLUTION INTRAVENOUS; SUBCUTANEOUS at 15:46

## 2020-05-12 RX ADMIN — BENZOCAINE PRN SPRAY: 200 SPRAY DENTAL; ORAL; PERIODONTAL at 15:26

## 2020-05-12 RX ADMIN — HEPARIN SODIUM SCH UNITS: 5000 INJECTION, SOLUTION INTRAVENOUS; SUBCUTANEOUS at 08:13

## 2020-05-12 NOTE — PCM.PREANE
Preanesthetic Assessment





- Procedure


Proposed Procedure: 





exploratory laparoscopy





- Anesthesia/Transfusion/Family Hx


Anesthesia History: Prior Anesthesia Without Reaction


Family History of Anesthesia Reaction: No


Transfusion History: No Prior Transfusion(s)





- Review of Systems


General: No Symptoms


Pulmonary: Shortness of Breath (with exertion)


Cardiovascular: Dyspnea on Exertion


Gastrointestinal: Decreased Appetite, Nausea, Vomiting


Neurological: Difficulty Walking (weak now), Weakness


Other: Reports: Easy Bleeding, Easy Bruising





- Physical Assessment


NPO Status Date: 05/12/20


NPO Status Time: 23:50


Vital Signs: 





 Last Vital Signs











Temp  98.1 F   05/12/20 11:18


 


Pulse  88   05/12/20 11:18


 


Resp  20   05/12/20 11:18


 


BP  120/62   05/12/20 11:18


 


Pulse Ox  95   05/12/20 11:18











Height: 5 ft


Weight: 59.148 kg


ASA Class: 3E


Mental Status: Alert & Oriented x3


Airway Class: Mallampati = 2 (poor oral care)


Dentition: Reports: Dentures (top and bottom)


Thyro-Mental Finger Breadths: 3


Mouth Opening Finger Breadths: 3


ROM/Head Extension: Full


Lungs: Clear to Auscultation


Cardiovascular: Regular Rate, Regular Rhythm





- Lab


Values: 





 Laboratory Last Values











WBC  9.59 K/mm3 (3.98-10.04)   05/11/20  04:55    


 


RBC  4.71 M/mm3 (3.98-5.22)   05/11/20  04:55    


 


Hgb  13.4 gm/dl (11.2-15.7)   05/11/20  04:55    


 


Hct  40.7 % (34.1-44.9)   05/11/20  04:55    


 


MCV  86.4 fl (79.4-94.8)   05/11/20  04:55    


 


MCH  28.5 pg (25.6-32.2)   05/11/20  04:55    


 


MCHC  32.9 g/dl (32.2-35.5)   05/11/20  04:55    


 


RDW Std Deviation  42.9 fL (36.4-46.3)   05/11/20  04:55    


 


Plt Count  157 K/mm3 (182-369)  L  05/11/20  04:55    


 


MPV  10.4 fl (9.4-12.3)   05/11/20  04:55    


 


Neut % (Auto)  83.6 % (34.0-71.1)  H  05/11/20  04:55    


 


Lymph % (Auto)  9.9 % (19.3-51.7)  L  05/11/20  04:55    


 


Mono % (Auto)  6.3 % (4.7-12.5)   05/11/20  04:55    


 


Eos % (Auto)  0  (0.7-5.8)  L  05/11/20  04:55    


 


Baso % (Auto)  0.1 % (0.1-1.2)   05/11/20  04:55    


 


Neut # (Auto)  8.02 K/mm3 (1.56-6.13)  H  05/11/20  04:55    


 


Lymph # (Auto)  0.95 K/mm3 (1.18-3.74)  L  05/11/20  04:55    


 


Mono # (Auto)  0.60 K/mm3 (0.24-0.36)  H  05/11/20  04:55    


 


Eos # (Auto)  0.00 K/mm3 (0.04-0.36)  L  05/11/20  04:55    


 


Baso # (Auto)  0.01 K/mm3 (0.01-0.08)   05/11/20  04:55    


 


Manual Slide Review  Abnormal smear   05/11/20  04:55    


 


Sodium  138 mEq/L (136-145)   05/11/20  04:55    


 


Potassium  3.9 mEq/L (3.5-5.1)   05/11/20  04:55    


 


Chloride  100 mEq/L ()   05/11/20  04:55    


 


Carbon Dioxide  30 mEq/L (21-32)   05/11/20  04:55    


 


Anion Gap  11.9  (5-15)   05/11/20  04:55    


 


BUN  15 mg/dL (7-18)   05/11/20  04:55    


 


Creatinine  0.9 mg/dL (0.55-1.02)   05/11/20  04:55    


 


Est Cr Clr Drug Dosing  31.04 mL/min  05/11/20  04:55    


 


Estimated GFR (MDRD)  59 mL/min (>60)   05/11/20  04:55    


 


BUN/Creatinine Ratio  16.7  (14-18)   05/11/20  04:55    


 


Glucose  122 mg/dL ()  H  05/11/20  04:55    


 


Calcium  8.2 mg/dL (8.5-10.1)  L  05/11/20  04:55    


 


Total Bilirubin  1.9 mg/dL (0.2-1.0)  H  05/11/20  04:55    


 


Direct Bilirubin  0.40 mg/dl (0.0-0.2)  H  05/11/20  04:55    


 


AST  20 U/L (15-37)   05/11/20  04:55    


 


ALT  12 U/L (14-59)  L  05/11/20  04:55    


 


Alkaline Phosphatase  40 U/L ()  L  05/11/20  04:55    


 


Total Protein  6.3 g/dl (6.4-8.2)  L  05/11/20  04:55    


 


Albumin  3.1 g/dl (3.4-5.0)  L  05/11/20  04:55    


 


Globulin  3.2 gm/dL  05/11/20  04:55    


 


Albumin/Globulin Ratio  1.0  (1-2)   05/11/20  04:55    


 


Lipase  107 U/L ()   05/09/20  22:11    


 


Urine Color  Yellow  (Yellow)   05/10/20  00:25    


 


Urine Appearance  Clear  (Clear)   05/10/20  00:25    


 


Urine pH  7.0  (5.0-8.0)   05/10/20  00:25    


 


Ur Specific Gravity  1.020  (1.005-1.030)   05/10/20  00:25    


 


Urine Protein  2+  (Negative)  H  05/10/20  00:25    


 


Urine Glucose (UA)  Negative  (Negative)   05/10/20  00:25    


 


Urine Ketones  1+  (Negative)  H  05/10/20  00:25    


 


Urine Occult Blood  2+  (Negative)  H  05/10/20  00:25    


 


Urine Nitrite  Negative  (Negative)   05/10/20  00:25    


 


Urine Bilirubin  Negative  (Negative)   05/10/20  00:25    


 


Urine Urobilinogen  0.2  (0.2-1.0)   05/10/20  00:25    


 


Ur Leukocyte Esterase  Negative  (Negative)   05/10/20  00:25    


 


Urine RBC  5-10 /hpf (0-5)  H  05/10/20  00:25    


 


Urine WBC  0-5 /hpf (0-5)   05/10/20  00:25    


 


Ur Epithelial Cells  0-5 /hpf (0-5)   05/10/20  00:25    


 


Urine Bacteria  Not seen /hpf (FEW)   05/10/20  00:25    


 


Urine Mucus  Not seen /hpf (FEW)   05/10/20  00:25    














- Allergies


Allergies/Adverse Reactions: 


 Allergies











Allergy/AdvReac Type Severity Reaction Status Date / Time


 


No Known Allergies Allergy   Verified 05/09/20 21:54














- Blood


Blood Available: No





- Acknowledgements


Anesthesia Type Planned: General Anesthesia


Pt an Appropriate Candidate for the Planned Anesthesia: Yes


Alternatives and Risks of Anesthesia Discussed w Pt/Guardian: Yes


Pt/Guardian Understands and Agrees with Anesthesia Plan: Yes





PreAnesthesia Questionnaire





- Past Health History


Medical/Surgical History: Denies Medical/Surgical History


HEENT History: Reports: Cataract, Hard of Hearing, Impaired Vision


Other HEENT History: Wears glasses and hearing aides


Cardiovascular History: Reports: High Cholesterol, Hypertension


Respiratory History: Reports: SOB


Gastrointestinal History: Reports: GERD, Other (See Below) (small bowel 

obstruction)


Genitourinary History: Reports: UTI, Recurrent


OB/GYN History: Reports: Pregnancy


Musculoskeletal History: Reports: Arthritis


Psychiatric History: Reports: None


Endocrine/Metabolic History: Reports: Osteoporosis


Oncologic (Cancer) History: Reports: None





- Infectious Disease History


Infectious Disease History: Reports: Chicken Pox, Measles, Mumps





- Past Surgical History


HEENT Surgical History: Reports: Cataract Surgery, Tonsillectomy


Cardiovascular Surgical History: Reports: None


GI Surgical History: Reports: Cholecystectomy


Female  Surgical History: Reports: None


Musculoskeletal Surgical History: Reports: None, Hip Replacement, Other (See 

Below)


Other Musculoskeletal Surgeries/Procedures:: Bilat





- SUBSTANCE USE


Smoking Status *Q: Never Smoker


Tobacco Use Within Last Twelve Months: No


Second Hand Smoke Exposure: No


Days Per Week of Alcohol Use: 0


Recreational Drug Use History: No





- HOME MEDS


Home Medications: 


 Home Meds





Aspirin [Halfprin] 81 mg PO DAILY 05/09/20 [History]


Cholecalciferol (Vitamin D3) [Vitamin D3] 2,000 unit PO DAILY 05/09/20 [History]


Denosumab [Prolia] 60 mg .XX ASDIRECTED 05/09/20 [History]


Fesoterodine Fumarate [Toviaz] 4 mg PO DAILY 05/09/20 [History]


Losartan [Cozaar] 50 mg PO BEDTIME 05/09/20 [History]


Omeprazole 20 mg PO DAILY 05/09/20 [History]


Pravastatin [Pravachol] 40 mg PO DAILY 05/09/20 [History]


Sulfamethoxazole/Trimethoprim [Bactrim 400-80 MG] 1 tab PO DAILY 05/10/20 [

History]











- CURRENT (IN HOUSE) MEDS


Current Meds: 





 Current Medications





Benzocaine (Hurricaine 20% Spray)  1 ml MUCMEM Q4HR PRN


   PRN Reason: Sore Throat


   Last Admin: 05/12/20 13:54 Dose:  1 spray


Heparin Sodium (Porcine) (Heparin Sodium)  5,000 units SUBCUT Q8H [facility]


   Last Admin: 05/12/20 08:13 Dose:  5,000 units


Hydralazine HCl (Apresoline)  5 mg IVPUSH Q2H PRN


   PRN Reason: Hypertension


   Last Admin: 05/10/20 04:32 Dose:  5 mg


Potassium Chloride/Dextrose/Sod Cl (D5 1/2 Ns W/ 20 Meq/L Kcl)  1,000 mls [facility] 75 

[facility]


   Last Admin: 05/12/20 13:54 Dose:  75 mls/hr


Ondansetron HCl (Zofran)  4 mg IVPUSH Q6H PRN


   PRN Reason: Nausea


   Last Admin: 05/11/20 02:32 Dose:  4 mg


Pantoprazole Sodium (Protonix Iv***)  40 mg IV Q24H [facility]


   Last Admin: 05/12/20 08:14 Dose:  40 mg





Discontinued Medications





Diatrizoate Meglum/Diatrizoate Sod (Gastrografin 37%)  90 ml PO ONETIME ONE


   Stop: 05/10/20 02:34


   Last Admin: 05/10/20 03:27 Dose:  90 ml


Sodium Chloride (Normal Saline)  1,000 mls @ 1,000 mls/hr IV ASDIRECTED [facility]


   Last Admin: 05/09/20 22:28 Dose:  1,000 mls/hr


Sodium Chloride (Normal Saline)  1,000 mls @ 1,000 mls/hr IV ASDIRECTED [facility]


   Last Admin: 05/09/20 23:47 Dose:  1,000 mls/hr


Lactated Ringer's (Ringers, Lactated)  1,000 mls @ 75 mls/hr IV ASDIRECTED [facility]


   Last Admin: 05/11/20 08:43 Dose:  75 mls/hr


Pantoprazole Sodium 40 mg/ (Sodium Chloride)  100 mls @ 200 mls/hr IV DAILY [facility]


   Last Admin: 05/10/20 10:30 Dose:  Not Given


Sodium Chloride (Normal Saline)  500 mls @ 500 mls/hr IV BOLUS ONE


   Stop: 05/11/20 20:41


   Last Admin: 05/11/20 19:56 Dose:  500 mls/hr


Iopamidol (Isovue-300 (61%))  100 ml IVPUSH ONETIME ONE


   Stop: 05/10/20 02:34


   Last Admin: 05/10/20 03:27 Dose:  100 ml


Losartan Potassium (Cozaar)  50 mg PO ONETIME STA


   Stop: 05/09/20 23:17


   Last Admin: 05/09/20 23:25 Dose:  50 mg


Ondansetron HCl (Zofran)  4 mg IVPUSH ONETIME ONE


   Stop: 05/09/20 22:16


   Last Admin: 05/09/20 22:28 Dose:  4 mg


Sodium Chloride (Saline Flush)  10 ml FLUSH ONETIME PRN


   PRN Reason: KEEP VEIN OPEN


   Last Admin: 05/10/20 03:27 Dose:  10 ml

## 2020-05-12 NOTE — PCM.SN.[email or web address]
- Free Text/Narrative


Note: 





S: no acute events overnight. Reports fatigue. No abdominal pain. No flatus. 





O:


AF-VSS


low urine output yesterday afternoon, responded well to 500 cc bolus NS


NG with lighter brownish output, 375 cc recorded for last 24 hrs


Abd slightly distended, nontender, dull to percussion





A:


Hospital day 3, small bowel obstruction. Appears clinically well, but no sign 

of resolution of obstruction at this point. 





P:


I discussed the plan of care with the patient and her daughter this morning and 

answered all questions to their satisfaction. We will reassess this evening, 

and if the patient still has significant NG output and no reported flatus, we 

will plan for diagnostic laparoscopy/lysis of adhesions tomorrow morning.

## 2020-05-13 RX ADMIN — HEPARIN SODIUM SCH UNITS: 5000 INJECTION, SOLUTION INTRAVENOUS; SUBCUTANEOUS at 16:44

## 2020-05-13 RX ADMIN — HEPARIN SODIUM SCH UNITS: 5000 INJECTION, SOLUTION INTRAVENOUS; SUBCUTANEOUS at 04:26

## 2020-05-13 RX ADMIN — DEXTROSE MONOHYDRATE, SODIUM CHLORIDE, AND POTASSIUM CHLORIDE SCH MLS/HR: 50; 4.5; 1.49 INJECTION, SOLUTION INTRAVENOUS at 04:26

## 2020-05-13 RX ADMIN — HEPARIN SODIUM SCH UNITS: 5000 INJECTION, SOLUTION INTRAVENOUS; SUBCUTANEOUS at 08:59

## 2020-05-13 NOTE — CR
Abdomen: Upright view of the abdomen was obtained.

 

Comparison: Prior CT abdomen and pelvis study of 05/10/20 hand prior 

abdominal x-ray also performed on 05/10/20.

 

Several slightly prominent loops of small bowel are noted within the 

left upper abdomen.  Bowel gas pattern is otherwise unremarkable.  

Bilateral hip prosthesis are noted.  Surgical clips are seen from 

prior cholecystectomy.  Bony structures are osteopenic.  No free air 

is seen.

 

Impression:

1.  Several slightly prominent small bowel loops remain within the 

left upper abdomen.

2.  Other findings believed to be incidental as described above.

 

Diagnostic code #3

 

This report was dictated in MDT

## 2020-05-13 NOTE — PCM.SN.2
- Free Text/Narrative


Note: 





S: no acute events. No BM after suppository last night. No nausea or bloating. 

Reports No flatus. NG output recorded at about 50 cc/24hrs, although wall 

suction was turned off at the time of exam this morning. 





O:


AF-VSS


Minor hypomagnesemia, hypocalcemia this morning, labs otherwise unremarkable. 


NG with thin clear/brown output


Abd with mild distention, soft, nontender, tympanitic with percussion





A:


Small bowel obstruction. Very little output from NG in past 24 hours, although 

it is not clear how long the tube has been off suction. No major residual on 

restarting suction. 





P:


Monitor NG output for another hour on suction; if no significant output plan 

for NG removal. No plan for OR at this time.

## 2020-05-14 RX ADMIN — HEPARIN SODIUM SCH UNITS: 5000 INJECTION, SOLUTION INTRAVENOUS; SUBCUTANEOUS at 08:47

## 2020-05-14 RX ADMIN — HEPARIN SODIUM SCH UNITS: 5000 INJECTION, SOLUTION INTRAVENOUS; SUBCUTANEOUS at 01:02

## 2020-05-14 NOTE — PCM.[email or web address]
**Discharge Summary





- Hospital Course


Free Text/Narrative:: 





Admitted 5/11/2020 with signs and symptoms of small bowel obstruction. Managed 

with IV fluids and NG decompression, with over 1L/24 hours output for the first 

48 hours. The output decreased, he symptoms improved, and she began passing 

flatus on 5/13. She was able to tolerate a regular diet starting 5/13 and 

continued to do well, passing flatus. She was deemed fit for discharge to home 

on 5/14.  


Modified Kristopher Scale: No Symptoms at All


Modified Sabine Scale Score: 0





- Discharge Data


Discharge Date: 05/14/20


Discharge Disposition: Home, Self-Care 01


Condition: Good





- Referral to Home Health


Primary Care Physician: 


Vinicio Albrecht MD








- Patient Summary/Data


Operative Procedure(s) Performed: none





- Patient Instructions


Diet: Usual Diet as Tolerated


Activity: As Tolerated


Showering/Bathing: May Shower


Notify Provider of: Increased Pain, Nausea and/or Vomiting





- Discharge Plan


*PRESCRIPTION DRUG MONITORING PROGRAM REVIEWED*: Not Applicable


*COPY OF PRESCRIPTION DRUG MONITORING REPORT IN PATIENT MOJGAN: Not Applicable


Home Medications: 


 Home Meds





Aspirin [Halfprin] 81 mg PO DAILY 05/09/20 [History]


Cholecalciferol (Vitamin D3) [Vitamin D3] 2,000 unit PO DAILY 05/09/20 [History]


Denosumab [Prolia] 60 mg .XX ASDIRECTED 05/09/20 [History]


Fesoterodine Fumarate [Toviaz] 4 mg PO DAILY 05/09/20 [History]


Losartan [Cozaar] 50 mg PO BEDTIME 05/09/20 [History]


Omeprazole 20 mg PO DAILY 05/09/20 [History]


Pravastatin [Pravachol] 40 mg PO DAILY 05/09/20 [History]


Sulfamethoxazole/Trimethoprim [Bactrim 400-80 MG] 1 tab PO DAILY 05/10/20 [

History]








Oxygen Therapy Mode: Room Air


Referrals: 


Vinicio Albrecht MD [Primary Care Provider] - 





- Discharge Summary/Plan Comment


DC Time >30 min.: No


Discharge Summary/Plan Comment: 





Resume all home medications. Resume regular diet. No activity restrictions. 

Follow up with surgery as needed. 





- Patient Data


Vitals - Most Recent: 


 Last Vital Signs











Temp  36.4 C   05/14/20 08:46


 


Pulse  78   05/14/20 08:46


 


Resp  18   05/14/20 08:46


 


BP  101/69   05/14/20 08:46


 


Pulse Ox  96   05/14/20 08:46











Weight - Most Recent: 59.08 kg


I&O - Last 24 hours: 


 Intake & Output











 05/13/20 05/14/20 05/14/20





 22:59 06:59 14:59


 


Intake Total 1770  


 


Output Total 650  


 


Balance 1120  











Med Orders - Current: 


 Current Medications





Benzocaine (Hurricaine 20% Spray)  1 ml MUCMEM Q4HR PRN


   PRN Reason: Sore Throat


   Last Admin: 05/12/20 15:26 Dose:  1 spray


Heparin Sodium (Porcine) (Heparin Sodium)  5,000 units SUBCUT Q8H [facility]


   Last Admin: 05/14/20 08:47 Dose:  5,000 units


Hydralazine HCl (Apresoline)  5 mg IVPUSH Q2H PRN


   PRN Reason: Hypertension


   Last Admin: 05/10/20 04:32 Dose:  5 mg


Pantoprazole Sodium (Protonix Iv***)  40 mg IV Q24H [facility]


   Last Admin: 05/14/20 09:38 Dose:  Not Given


Polyethylene Glycol (Miralax)  17 gm PO ONETIME ONE


   Stop: 05/14/20 10:07





Discontinued Medications





Bisacodyl (Dulcolax)  10 mg RECTAL ONETIME ONE


   Stop: 05/12/20 19:04


   Last Admin: 05/12/20 20:36 Dose:  10 mg


Diatrizoate Meglum/Diatrizoate Sod (Gastrografin 37%)  90 ml PO ONETIME ONE


   Stop: 05/10/20 02:34


   Last Admin: 05/10/20 03:27 Dose:  90 ml


Heparin Sodium (Porcine) (Heparin Sodium)  5,000 units SUBCUT Q8H [facility]


   Last Admin: 05/13/20 04:26 Dose:  5,000 units


Sodium Chloride (Normal Saline)  1,000 mls @ 1,000 mls/hr IV ASDIRECTED [facility]


   Last Admin: 05/09/20 22:28 Dose:  1,000 mls/hr


Sodium Chloride (Normal Saline)  1,000 mls @ 1,000 mls/hr IV ASDIRECTED [facility]


   Last Admin: 05/09/20 23:47 Dose:  1,000 mls/hr


Lactated Ringer's (Ringers, Lactated)  1,000 mls @ 75 mls/hr IV ASDIRECTED [facility]


   Last Admin: 05/11/20 08:43 Dose:  75 mls/hr


Pantoprazole Sodium 40 mg/ (Sodium Chloride)  100 mls @ 200 mls/hr IV DAILY [facility]


   Last Admin: 05/10/20 10:30 Dose:  Not Given


Potassium Chloride/Dextrose/Sod Cl (D5 1/2 Ns W/ 20 Meq/L Kcl)  1,000 mls @ 75 

mls/hr IV ASDIRECTED [facility]


   Last Admin: 05/13/20 04:26 Dose:  75 mls/hr


Sodium Chloride (Normal Saline)  500 mls @ 500 mls/hr IV BOLUS ONE


   Stop: 05/11/20 20:41


   Last Admin: 05/11/20 19:56 Dose:  500 mls/hr


Sodium Chloride (Normal Saline)  500 mls @ 500 mls/hr IV ONETIME ONE


   Stop: 05/12/20 19:52


   Last Admin: 05/12/20 20:37 Dose:  500 mls/hr


Magnesium Sulfate 2 gm/ Premix  50 mls @ 25 mls/hr IV ONETIME ONE


   Stop: 05/13/20 10:59


   Last Admin: 05/13/20 09:17 Dose:  25 mls/hr


Calcium Gluconate 1 gm/ Sodium (Chloride)  60 mls @ 180 mls/hr IV ONETIME ONE


   Stop: 05/13/20 08:49


   Last Admin: 05/13/20 08:47 Dose:  180 mls/hr


Iopamidol (Isovue-300 (61%))  100 ml IVPUSH ONETIME ONE


   Stop: 05/10/20 02:34


   Last Admin: 05/10/20 03:27 Dose:  100 ml


Losartan Potassium (Cozaar)  50 mg PO ONETIME STA


   Stop: 05/09/20 23:17


   Last Admin: 05/09/20 23:25 Dose:  50 mg


Ondansetron HCl (Zofran)  4 mg IVPUSH ONETIME ONE


   Stop: 05/09/20 22:16


   Last Admin: 05/09/20 22:28 Dose:  4 mg


Ondansetron HCl (Zofran)  4 mg IVPUSH Q6H PRN


   PRN Reason: Nausea


   Last Admin: 05/11/20 02:32 Dose:  4 mg


Polyethylene Glycol (Miralax)  17 gm PO ONETIME ONE


   Stop: 05/13/20 18:07


   Last Admin: 05/13/20 18:20 Dose:  17 gm


Sodium Chloride (Saline Flush)  10 ml FLUSH ONETIME PRN


   PRN Reason: KEEP VEIN OPEN


   Last Admin: 05/10/20 03:27 Dose:  10 ml